# Patient Record
(demographics unavailable — no encounter records)

---

## 2024-11-06 NOTE — DISCUSSION/SUMMARY
[FreeTextEntry1] : 38 year old woman who has daytime somnolence, dry mouth, heavy snoring and witnessed apnea  She has nasl congestion, using Afrin  She was previously tested in [place], where she was told of obstructive sleep apnea  Surgery was recommended but details unavailable   PLAN wean off Afrin Use saline nasal wash  Use Flonase nasal and ipratropium nasal spray Refer to ENT  obtain sinus CT obtain home overnight sleep study  Consider further treatments including use of CPAP based on results  Total time spent : 40 minutes Including: Preparation prior to visit - Reviewing prior record, results of tests and Consultation Reports as applicable Conducting an appropriate H & P during today's encounter  reviewing all available CT chest exams.  demonstrating images to pt as appropriate Counseling patient  Note completion  med renewal discussing differential diagnosis  Brannon Cook MD

## 2024-11-06 NOTE — HISTORY OF PRESENT ILLNESS
[Never] : never [TextBox_4] : 38 year old patient presents for evaluation of  snoring, with gasping in sleep.  This has been present for several years. She feels she is choking in her sleep. She also has nasal congestion.    She has headaches and some sinus symptoms.    In [facility], she underwent home sleep study. She has not received CPAP.  She was offered possible surgery.    She uses Afrin  Primary doctor is  Dr Meneses     PSH:  none       PMH:  nasal congestion           SH:   never smoker      ETOH:  occasional     Occupation:  finance, in [facility]   No exposure to chemicals, dust, asbestos, mold        ALLERGY:   NKDA    environmental/seasonal allergy:  none       Review of Systems:    no dysphagia has dry mouth   no arthritis no joint aches no joint swelling    no obstructive airway disease  no pneumonia no wheeze no lung cancer   no CAD no MI no chest pain no murmur no CHF no HTN no edema   no peptic ulcer or gastritis no GERD no abdominal pain no liver disease   no Diabetes no thyroid disease no hyperlipidemia     no bleeding   no DVT or PE   no kidney disease   no stroke no seizure

## 2024-11-06 NOTE — HISTORY OF PRESENT ILLNESS
[Never] : never [TextBox_4] : 38 year old patient presents for evaluation of  snoring, with gasping in sleep.  This has been present for several years. She feels she is choking in her sleep. She also has nasal congestion.    She has headaches and some sinus symptoms.    [facility]. She has not received CPAP.  She was offered possible surgery.    She uses Afrin  Primary doctor is  Dr Meneses     PSH:  none       PMH:  nasal congestion           SH:   never smoker      ETOH:  occasional     Occupation:  finance, in [facility]   No exposure to chemicals, dust, asbestos, mold        ALLERGY:   NKDA    environmental/seasonal allergy:  none       Review of Systems:    no dysphagia has dry mouth   no arthritis no joint aches no joint swelling    no obstructive airway disease  no pneumonia no wheeze no lung cancer   no CAD no MI no chest pain no murmur no CHF no HTN no edema   no peptic ulcer or gastritis no GERD no abdominal pain no liver disease   no Diabetes no thyroid disease no hyperlipidemia     no bleeding   no DVT or PE   no kidney disease   no stroke no seizure

## 2024-11-06 NOTE — PHYSICAL EXAM
[No Acute Distress] : no acute distress [Normal Oropharynx] : normal oropharynx [I] : Mallampati Class: I [Normal Appearance] : normal appearance [No Neck Mass] : no neck mass [Normal Rate/Rhythm] : normal rate/rhythm [Normal S1, S2] : normal s1, s2 [No Murmurs] : no murmurs [No Resp Distress] : no resp distress [Clear to Auscultation Bilaterally] : clear to auscultation bilaterally [No Abnormalities] : no abnormalities [Benign] : benign [Normal Gait] : normal gait [No Clubbing] : no clubbing [No Cyanosis] : no cyanosis [No Edema] : no edema [FROM] : FROM [Normal Color/ Pigmentation] : normal color/ pigmentation [No Focal Deficits] : no focal deficits [Oriented x3] : oriented x3 [Normal Affect] : normal affect [TextBox_44] : thick neck

## 2024-12-04 NOTE — HISTORY OF PRESENT ILLNESS
[Never] : never [TextBox_4] : 38 year old patient presents for evaluation of  snoring, with gasping in sleep.  This has been present for several years. She feels she is choking in her sleep. She also has nasal congestion.    She has headaches and some sinus symptoms.    In [facility], she underwent home sleep study. She has not received CPAP.  She was offered possible surgery.    She uses Afrin  Primary doctor is  Dr Meneses     PSH:  none       PMH:  nasal congestion           SH:   never smoker      ETOH:  occasional     Occupation:  finance, in [facility]   No exposure to chemicals, dust, asbestos, mold        ALLERGY:   NKDA    environmental/seasonal allergy:  none       Review of Systems:    no dysphagia has dry mouth   no arthritis no joint aches no joint swelling    no obstructive airway disease  no pneumonia no wheeze no lung cancer   no CAD no MI no chest pain no murmur no CHF no HTN no edema   no peptic ulcer or gastritis no GERD no abdominal pain no liver disease   no Diabetes no thyroid disease no hyperlipidemia     no bleeding   no DVT or PE   no kidney disease   no stroke no seizure

## 2025-01-16 NOTE — REASON FOR VISIT
[Initial Evaluation] : an initial evaluation for [Other: _____] : [unfilled] [Patient Declined  Services] : - None: Patient declined  services [FreeTextEntry2] : Sleep apnea  [FreeTextEntry3] : Patient declined offer of translation service. Patient preferred to use accompanying family member/friend for translation.  [TWNoteComboBox1] : Ecuadorean

## 2025-01-16 NOTE — REASON FOR VISIT
[Initial Evaluation] : an initial evaluation for [Other: _____] : [unfilled] [Patient Declined  Services] : - None: Patient declined  services [FreeTextEntry2] : Sleep apnea  [FreeTextEntry3] : Patient declined offer of translation service. Patient preferred to use accompanying family member/friend for translation.  [TWNoteComboBox1] : Paraguayan

## 2025-01-16 NOTE — CONSULT LETTER
[Dear  ___] : Dear  [unfilled], [Consult Letter:] : I had the pleasure of evaluating your patient, [unfilled]. [Please see my note below.] : Please see my note below. [Consult Closing:] : Thank you very much for allowing me to participate in the care of this patient.  If you have any questions, please do not hesitate to contact me. [Sincerely,] : Sincerely, [FreeTextEntry2] : Dr. Cook  [FreeTextEntry3] : Noam Franco M.D. Division of Laryngology | Department of Otolaryngology  [address] [address]

## 2025-01-16 NOTE — HISTORY OF PRESENT ILLNESS
[de-identified] : MIRI MIR  is a 38 year  old woman who presents to the [facility] for their snoring and suspected sleep apnea. She is referred by Dr. Cook who last saw the patient on 11/6/24. He ordered her to stop Afrin, use nasal rinses, get home PSG, start flonase, and get a CT sinus at that time.  She has not completed new sleep study that was ordered in November.    In [facility], she underwent home sleep study. She is no longer using Saline irrigations, flonase or ipratropium - she believes it was not helping   AHI: ~  ~ (documented from sleep study performed on ~  ~) Gasping in sleep. This has been present for several years. She feels she is choking in her sleep. Also has daytime fatigue. She has not had prior DISE. Current alcohol usage: None  Prior throat surgery: None  Has not previously used CPAP.  Has congestion right nostril. Denies dyspnea, dysphagia, odynophagia, fevers/chills, and unintentional weight loss.  Criteria for Inspire hypoglossal nerve implant: She ~  ~ have moderate to severe VALDEMAR (AHI range from  with <25% central apneas). She is unable or unwilling to appropriately use CPAP or BiPAP. Currently unknown if she is free of complete concentric collapse at the palate   Potential contraindications for inspire hypoglossal nerve implant: She has a BMI that is: 27.29kg

## 2025-01-16 NOTE — CONSULT LETTER
[Dear  ___] : Dear  [unfilled], [Consult Letter:] : I had the pleasure of evaluating your patient, [unfilled]. [Please see my note below.] : Please see my note below. [Consult Closing:] : Thank you very much for allowing me to participate in the care of this patient.  If you have any questions, please do not hesitate to contact me. [Sincerely,] : Sincerely, [FreeTextEntry2] : Dr. Cook  [FreeTextEntry3] : Noam Franco M.D. Division of Laryngology | Department of Otolaryngology  [address] [address]

## 2025-01-16 NOTE — PROCEDURE
[de-identified] : NASAL ENDOSCOPY: Following administration of numbing and decongestant sprays, the nasal cavities were carefully inspected with a flexible endoscope. Their septum is mild to moderately devaited to the left. The anterior nasal mucosa is normal appearing bilaterally. The right side was inspected first. The inferior and middle turbinates are anatomic in their positioning. The middle meatus appears clear. The nasopharynx was clear and there was no mucopurulence noted. The bilateral eustachian tube orifices were patent and unobstructed. The mucosa throughout on the right appeared normal. The left side was inspected in similar fashion. Again, the inferior and middle turbinates were anatomic in positioning. The middle meatus appeared free of lesions, obstruction or mucopurulence. The mucosa throughout the left nasal cavity appeared normal. [de-identified] : Procedure: Transnasal flexible laryngoscopy   Description: Informed consent was obtained from the patient prior to the procedure. The patient was seated in the clinic chair. Topical anesthesia was achieved by first spraying the nasal cavities with 4% lidocaine and 1% phenylephrine.   Exam: Clear vallecula, crisp epiglottis. Aryepiglottic folds: intact and symmetric bilaterally Hypopharynx: No pooling Interarytenoid space: No lesions or pachydermia False vocal folds: Symmetric and without lesions or masses. False fold voicing (plica ventricularis) is not noted True vocal folds: normal and symmetric motion bilaterally. No paradoxical motion. Right medial edge is crisp and shows no lesions or masses. Left medial edge is crisp and shows no lesions or masses. Mucosal covering is minimally edematous. No erythema. No obvious vascular ectasias. Vocal processes do not demonstrate granulomas. Subglottis/proximal trachea: clear and unobstructed to the limits of the examination today. Other: ~  ~

## 2025-01-16 NOTE — ASSESSMENT
[FreeTextEntry1] :  Assessment/Plan: #1 Suspicion for VALDEMAR #2 Right sided subjective nasal congestion #3 Mild septal deviation to the left  Even after Afrin spray and scope showing wide open right nasal passage and deviation of septum to left patient complaining of difficulty with breathing on right.  It is likely that she just feels the air more on the left and thus interprets this to mean she is not breathing well on right side.  Septoplasty would not help with this symptom. She may trial Flonase again for 1 month total and using appropriately.  She is to reach out to her pulmonologist as she never got scheduled for home sleep study.  If she has moderate to severe VALDEMAR and does not tolerate CPAP I think she would likely be a candidate for Inspire and I would want to see back in clinic. Otherwise follow up as needed.

## 2025-01-16 NOTE — PROCEDURE
[de-identified] : NASAL ENDOSCOPY: Following administration of numbing and decongestant sprays, the nasal cavities were carefully inspected with a flexible endoscope. Their septum is mild to moderately devaited to the left. The anterior nasal mucosa is normal appearing bilaterally. The right side was inspected first. The inferior and middle turbinates are anatomic in their positioning. The middle meatus appears clear. The nasopharynx was clear and there was no mucopurulence noted. The bilateral eustachian tube orifices were patent and unobstructed. The mucosa throughout on the right appeared normal. The left side was inspected in similar fashion. Again, the inferior and middle turbinates were anatomic in positioning. The middle meatus appeared free of lesions, obstruction or mucopurulence. The mucosa throughout the left nasal cavity appeared normal. [de-identified] : Procedure: Transnasal flexible laryngoscopy   Description: Informed consent was obtained from the patient prior to the procedure. The patient was seated in the clinic chair. Topical anesthesia was achieved by first spraying the nasal cavities with 4% lidocaine and 1% phenylephrine.   Exam: Clear vallecula, crisp epiglottis. Aryepiglottic folds: intact and symmetric bilaterally Hypopharynx: No pooling Interarytenoid space: No lesions or pachydermia False vocal folds: Symmetric and without lesions or masses. False fold voicing (plica ventricularis) is not noted True vocal folds: normal and symmetric motion bilaterally. No paradoxical motion. Right medial edge is crisp and shows no lesions or masses. Left medial edge is crisp and shows no lesions or masses. Mucosal covering is minimally edematous. No erythema. No obvious vascular ectasias. Vocal processes do not demonstrate granulomas. Subglottis/proximal trachea: clear and unobstructed to the limits of the examination today. Other: ~  ~

## 2025-01-16 NOTE — HISTORY OF PRESENT ILLNESS
[de-identified] : MIRI MIR  is a 38 year  old woman who presents to the [facility] for their snoring and suspected sleep apnea. She is referred by Dr. Cook who last saw the patient on 11/6/24. He ordered her to stop Afrin, use nasal rinses, get home PSG, start flonase, and get a CT sinus at that time.  She has not completed new sleep study that was ordered in November.    In [facility], she underwent home sleep study. She is no longer using Saline irrigations, flonase or ipratropium - she believes it was not helping   AHI: ~  ~ (documented from sleep study performed on ~  ~) Gasping in sleep. This has been present for several years. She feels she is choking in her sleep. Also has daytime fatigue. She has not had prior DISE. Current alcohol usage: None  Prior throat surgery: None  Has not previously used CPAP.  Has congestion right nostril. Denies dyspnea, dysphagia, odynophagia, fevers/chills, and unintentional weight loss.  Criteria for Inspire hypoglossal nerve implant: She ~  ~ have moderate to severe VALDEMAR (AHI range from  with <25% central apneas). She is unable or unwilling to appropriately use CPAP or BiPAP. Currently unknown if she is free of complete concentric collapse at the palate   Potential contraindications for inspire hypoglossal nerve implant: She has a BMI that is: 27.29kg

## 2025-05-05 NOTE — HISTORY OF PRESENT ILLNESS
[FreeTextEntry1] :  38 year F presents for annual visit.   LMP: Menses: SA/Contraception: Yes, men, none   OBHx: GYNHx: PMHx: VALDEMAR PSHx: Denies FamHx: Denies fam hx of cancer Meds: Allergies: NKDA Soc Hx: Denies   Health Maintenance: Last Pap - Gardasil -

## 2025-05-05 NOTE — PHYSICAL EXAM
[Chaperoned Physical Exam] : A chaperone was present in the examining room during all aspects of the physical examination. [Appropriately responsive] : appropriately responsive [Alert] : alert [No Acute Distress] : no acute distress [No Lymphadenopathy] : no lymphadenopathy [Soft] : soft [Non-tender] : non-tender [Non-distended] : non-distended [No HSM] : No HSM [No Lesions] : no lesions [No Mass] : no mass [Oriented x3] : oriented x3 [Examination Of The Breasts] : a normal appearance [No Masses] : no breast masses were palpable [Labia Majora] : normal [Labia Minora] : normal [Normal] : normal [Uterine Adnexae] : normal